# Patient Record
Sex: FEMALE | Race: BLACK OR AFRICAN AMERICAN | NOT HISPANIC OR LATINO | RURAL
[De-identification: names, ages, dates, MRNs, and addresses within clinical notes are randomized per-mention and may not be internally consistent; named-entity substitution may affect disease eponyms.]

---

## 2023-05-30 ENCOUNTER — OFFICE VISIT (OUTPATIENT)
Dept: PRIMARY CARE CLINIC | Facility: CLINIC | Age: 23
End: 2023-05-30
Payer: MEDICAID

## 2023-05-30 VITALS
TEMPERATURE: 98 F | BODY MASS INDEX: 32.95 KG/M2 | SYSTOLIC BLOOD PRESSURE: 115 MMHG | WEIGHT: 205 LBS | HEIGHT: 66 IN | HEART RATE: 75 BPM | RESPIRATION RATE: 18 BRPM | DIASTOLIC BLOOD PRESSURE: 75 MMHG | OXYGEN SATURATION: 99 %

## 2023-05-30 DIAGNOSIS — R11.0 NAUSEA: ICD-10-CM

## 2023-05-30 DIAGNOSIS — Z32.01 POSITIVE URINE PREGNANCY TEST: Primary | ICD-10-CM

## 2023-05-30 LAB
B-HCG UR QL: POSITIVE
CTP QC/QA: YES

## 2023-05-30 PROCEDURE — 1159F MED LIST DOCD IN RCRD: CPT | Mod: CPTII,,, | Performed by: STUDENT IN AN ORGANIZED HEALTH CARE EDUCATION/TRAINING PROGRAM

## 2023-05-30 PROCEDURE — 99203 PR OFFICE/OUTPT VISIT, NEW, LEVL III, 30-44 MIN: ICD-10-PCS | Mod: ,,, | Performed by: STUDENT IN AN ORGANIZED HEALTH CARE EDUCATION/TRAINING PROGRAM

## 2023-05-30 PROCEDURE — 1160F RVW MEDS BY RX/DR IN RCRD: CPT | Mod: CPTII,,, | Performed by: STUDENT IN AN ORGANIZED HEALTH CARE EDUCATION/TRAINING PROGRAM

## 2023-05-30 PROCEDURE — 3008F BODY MASS INDEX DOCD: CPT | Mod: CPTII,,, | Performed by: STUDENT IN AN ORGANIZED HEALTH CARE EDUCATION/TRAINING PROGRAM

## 2023-05-30 PROCEDURE — 1159F PR MEDICATION LIST DOCUMENTED IN MEDICAL RECORD: ICD-10-PCS | Mod: CPTII,,, | Performed by: STUDENT IN AN ORGANIZED HEALTH CARE EDUCATION/TRAINING PROGRAM

## 2023-05-30 PROCEDURE — 3078F DIAST BP <80 MM HG: CPT | Mod: CPTII,,, | Performed by: STUDENT IN AN ORGANIZED HEALTH CARE EDUCATION/TRAINING PROGRAM

## 2023-05-30 PROCEDURE — 99203 OFFICE O/P NEW LOW 30 MIN: CPT | Mod: ,,, | Performed by: STUDENT IN AN ORGANIZED HEALTH CARE EDUCATION/TRAINING PROGRAM

## 2023-05-30 PROCEDURE — 3008F PR BODY MASS INDEX (BMI) DOCUMENTED: ICD-10-PCS | Mod: CPTII,,, | Performed by: STUDENT IN AN ORGANIZED HEALTH CARE EDUCATION/TRAINING PROGRAM

## 2023-05-30 PROCEDURE — 1160F PR REVIEW ALL MEDS BY PRESCRIBER/CLIN PHARMACIST DOCUMENTED: ICD-10-PCS | Mod: CPTII,,, | Performed by: STUDENT IN AN ORGANIZED HEALTH CARE EDUCATION/TRAINING PROGRAM

## 2023-05-30 PROCEDURE — 3078F PR MOST RECENT DIASTOLIC BLOOD PRESSURE < 80 MM HG: ICD-10-PCS | Mod: CPTII,,, | Performed by: STUDENT IN AN ORGANIZED HEALTH CARE EDUCATION/TRAINING PROGRAM

## 2023-05-30 PROCEDURE — 81025 URINE PREGNANCY TEST: CPT | Mod: RHCUB | Performed by: STUDENT IN AN ORGANIZED HEALTH CARE EDUCATION/TRAINING PROGRAM

## 2023-05-30 PROCEDURE — 3074F SYST BP LT 130 MM HG: CPT | Mod: CPTII,,, | Performed by: STUDENT IN AN ORGANIZED HEALTH CARE EDUCATION/TRAINING PROGRAM

## 2023-05-30 PROCEDURE — 3074F PR MOST RECENT SYSTOLIC BLOOD PRESSURE < 130 MM HG: ICD-10-PCS | Mod: CPTII,,, | Performed by: STUDENT IN AN ORGANIZED HEALTH CARE EDUCATION/TRAINING PROGRAM

## 2023-05-30 RX ORDER — PYRIDOXINE HCL (VITAMIN B6) 25 MG
25 TABLET ORAL EVERY 6 HOURS PRN
Qty: 30 TABLET | Refills: 1 | Status: SHIPPED | OUTPATIENT
Start: 2023-05-30 | End: 2023-06-29

## 2023-05-30 NOTE — LETTER
May 30, 2023      [facility]  [address] [facility] [address]  Phone: [phone]  Fax: [phone]       Patient: Natalia Riggs   YOB: 2000  Date of Visit: 05/30/2023    To Whom It May Concern:    Chio Riggs  was at [facility] on 05/30/2023.  If you have any questions or concerns, or if I can be of further assistance, please do not hesitate to contact me.      Sincerely,    Ivanna Roth MD

## 2023-05-30 NOTE — PROGRESS NOTES
Subjective:      Natalia Riggs is a 22 y.o.female who presents to clinic for late menses (X 2 months)    Patient presents to clinic for evaluation of a late period. LMP: ~3/20/23. She took two home pregnant tests and both were negative. She is not trying to get pregnant. She has a gynecologist that she sees in [place]. Denies vaginal bleeding, vaginal discharge, or abdominal pain. She is having some nausea and hypersensitivity to smells. She is not taking any medications including a PNV. This will be her third pregnancy - she has 2 living kids born term.     ROS  as above    Past Medical History:  has no past medical history on file.   Past Surgical History:  has no past surgical history on file.  Family History: family history includes Arthritis in her maternal grandmother; Hypertension in her maternal grandmother and mother.  Social History:  reports that she has never smoked. She has never used smokeless tobacco. She reports that she does not drink alcohol and does not use drugs.  Allergies: Review of patient's allergies indicates:  No Known Allergies    Objective:     Vitals:    05/30/23 0822   BP: 115/75   Pulse: 75   Resp: 18   Temp: 98 °F (36.7 °C)     Physical Exam  Vitals reviewed.   Constitutional:       General: She is not in acute distress.     Appearance: Normal appearance. She is not ill-appearing.   HENT:      Head: Normocephalic and atraumatic.      Right Ear: External ear normal.      Left Ear: External ear normal.   Eyes:      General: No scleral icterus.        Right eye: No discharge.         Left eye: No discharge.      Conjunctiva/sclera: Conjunctivae normal.   Cardiovascular:      Rate and Rhythm: Normal rate and regular rhythm.      Pulses: Normal pulses.   Pulmonary:      Effort: Pulmonary effort is normal. No respiratory distress.      Breath sounds: Normal breath sounds. No wheezing.   Musculoskeletal:      Right lower leg: No edema.      Left lower leg: No edema.   Neurological:       General: No focal deficit present.      Mental Status: She is alert.   Psychiatric:         Behavior: Behavior normal.          Assessment/Plan:     1. Positive urine pregnancy test  - UPT +  - encouraged pt to start prenatal vitamins  - reach out to [facility] regarding prenatal care appts - is established at a clinic [facility]  - avoid alcohol and tobacco use while pregnant  - POCT urine pregnancy  - pyridoxine, vitamin B6, (VITAMIN B-6) 25 MG Tab; Take 1 tablet (25 mg total) by mouth every 6 (six) hours as needed (nausea).  Dispense: 30 tablet; Refill: 1    2. Nausea  - small meals, crackers, B6 prn and ginger candy prn for nausea  - pyridoxine, vitamin B6, (VITAMIN B-6) 25 MG Tab; Take 1 tablet (25 mg total) by mouth every 6 (six) hours as needed (nausea).  Dispense: 30 tablet; Refill: 1           Return to clinic as needed.     Ivanna Roth MD  Family Medicine  05/30/2023